# Patient Record
Sex: FEMALE | Race: WHITE | NOT HISPANIC OR LATINO | URBAN - METROPOLITAN AREA
[De-identification: names, ages, dates, MRNs, and addresses within clinical notes are randomized per-mention and may not be internally consistent; named-entity substitution may affect disease eponyms.]

---

## 2023-05-15 LAB
5-HYDROXYINDOLEACETATE (MG/L) IN URINE: NORMAL
5-HYDROXYINDOLEACETATE (MG/ML) IN 24 HOUR URINE: NORMAL
ALANINE AMINOTRANSFERASE (SGPT) (U/L) IN SER/PLAS: NORMAL
ALBUMIN (G/DL) IN SER/PLAS: NORMAL
ALKALINE PHOSPHATASE (U/L) IN SER/PLAS: NORMAL
ANION GAP IN SER/PLAS: NORMAL
ASPARTATE AMINOTRANSFERASE (SGOT) (U/L) IN SER/PLAS: NORMAL
BILIRUBIN TOTAL (MG/DL) IN SER/PLAS: NORMAL
C PEPTIDE (NG/ML) IN SER/PLAS: NORMAL
CALCIUM (MG/DL) IN SER/PLAS: NORMAL
CARBON DIOXIDE, TOTAL (MMOL/L) IN SER/PLAS: NORMAL
CHLORIDE (MMOL/L) IN SER/PLAS: NORMAL
CHOLESTEROL (MG/DL) IN SER/PLAS: NORMAL
CHOLESTEROL IN HDL (MG/DL) IN SER/PLAS: NORMAL
CHOLESTEROL/HDL RATIO: NORMAL
COLLECTION DURATION OF URINE: 24 HR
COLLECTION DURATION OF URINE: NORMAL
CREATININE (MG/24HR) IN 24 HOUR URINE: 1.43 G/24H (ref 0.67–1.59)
CREATININE (MG/24HR) IN 24 HOUR URINE: NORMAL
CREATININE (MG/DL) IN SER/PLAS: NORMAL
CREATININE (MG/DL) IN URINE: 81.9 MG/DL (ref 20–320)
CREATININE (MG/DL) IN URINE: NORMAL
ESTIMATED AVERAGE GLUCOSE FOR HBA1C: NORMAL
GFR FEMALE: NORMAL
GFR MALE: NORMAL
GLUCOSE (MG/DL) IN SER/PLAS: NORMAL
HEMOGLOBIN A1C/HEMOGLOBIN TOTAL IN BLOOD: NORMAL
HGB A1C-DATA CONVERSION: NORMAL %
INSULIN: NORMAL
LDL: NORMAL
LIPASE (U/L) IN SER/PLAS: NORMAL
NON HDL CHOLESTEROL: NORMAL
POTASSIUM (MMOL/L) IN SER/PLAS: NORMAL
PROTEIN TOTAL: NORMAL
SODIUM (MMOL/L) IN SER/PLAS: NORMAL
THYROTROPIN (MIU/L) IN SER/PLAS BY DETECTION LIMIT <= 0.05 MIU/L: NORMAL
TRIGLYCERIDE (MG/DL) IN SER/PLAS: NORMAL
UREA NITROGEN (MG/DL) IN SER/PLAS: NORMAL
VLDL: NORMAL
VOLUME OF URINE (LC): NORMAL
VOLUME OF URINE: 1750 ML
VOLUME OF URINE: NORMAL

## 2023-06-12 LAB
5-HYDROXYINDOLEACETATE (MG/L) IN URINE: 2.8 MG/L
5-HYDROXYINDOLEACETATE (MG/ML) IN 24 HOUR URINE: 4.9 MG/24 HR (ref 0–14.9)
VOLUME OF URINE (LC): 1750

## 2023-10-03 ENCOUNTER — TELEPHONE (OUTPATIENT)
Dept: INTERNAL MEDICINE | Facility: HOSPITAL | Age: 63
End: 2023-10-03
Payer: COMMERCIAL

## 2023-10-03 DIAGNOSIS — T38.0X5A STEROID-INDUCED HYPERGLYCEMIA: ICD-10-CM

## 2023-10-03 DIAGNOSIS — R73.9 STEROID-INDUCED HYPERGLYCEMIA: ICD-10-CM

## 2023-10-03 DIAGNOSIS — R73.03 PREDIABETES: Primary | ICD-10-CM

## 2023-10-03 RX ORDER — GLIPIZIDE 5 MG/1
5 TABLET ORAL
Qty: 30 TABLET | Refills: 11 | Status: SHIPPED | OUTPATIENT
Start: 2023-10-03 | End: 2023-10-06 | Stop reason: SDUPTHER

## 2023-10-03 NOTE — TELEPHONE ENCOUNTER
SSI per belwo plus glipzide with high dose steroid   Humolog  if sugars before meals  <200 no humolog   201-250 use 2 units of humolog   251-300 use 3 units of humolog  >301 use 4 units  of humolog and not coming down after 40 min.

## 2023-10-05 PROBLEM — R11.0 NAUSEA IN ADULT: Status: ACTIVE | Noted: 2023-10-05

## 2023-10-05 PROBLEM — J32.9 RECURRENT SINUSITIS: Status: ACTIVE | Noted: 2023-10-05

## 2023-10-05 PROBLEM — F95.9 SIMPLE TICS: Status: ACTIVE | Noted: 2023-10-05

## 2023-10-05 PROBLEM — M17.11 PRIMARY OSTEOARTHRITIS OF RIGHT KNEE: Status: ACTIVE | Noted: 2023-10-05

## 2023-10-05 PROBLEM — M54.50 LOW BACK PAIN: Status: ACTIVE | Noted: 2023-10-05

## 2023-10-05 PROBLEM — K52.9 CHRONIC DIARRHEA OF UNKNOWN ORIGIN: Status: ACTIVE | Noted: 2023-10-05

## 2023-10-05 PROBLEM — J45.909 ASTHMA (HHS-HCC): Status: ACTIVE | Noted: 2023-10-05

## 2023-10-05 PROBLEM — H57.10 PAIN IN EYE: Status: ACTIVE | Noted: 2023-10-05

## 2023-10-05 PROBLEM — E55.9 VITAMIN D DEFICIENCY: Status: ACTIVE | Noted: 2023-10-05

## 2023-10-05 PROBLEM — R61 EXCESSIVE SWEATING: Status: ACTIVE | Noted: 2023-10-05

## 2023-10-05 PROBLEM — D3A.8 NEUROENDOCRINE TUMOR (CMS-HCC): Status: ACTIVE | Noted: 2023-10-05

## 2023-10-05 PROBLEM — R73.9 BORDERLINE HYPERGLYCEMIA: Status: ACTIVE | Noted: 2023-10-05

## 2023-10-05 PROBLEM — G25.71 RESTLESS MOVEMENT DISORDER: Status: ACTIVE | Noted: 2023-10-05

## 2023-10-05 PROBLEM — E01.2 COLLOID GOITER: Status: ACTIVE | Noted: 2023-10-05

## 2023-10-05 PROBLEM — E78.5 HYPERLIPIDEMIA: Status: ACTIVE | Noted: 2023-10-05

## 2023-10-05 PROBLEM — F51.01 INSOMNIA, IDIOPATHIC: Status: ACTIVE | Noted: 2023-10-05

## 2023-10-05 PROBLEM — I82.90 VENOUS THROMBOSIS: Status: ACTIVE | Noted: 2023-10-05

## 2023-10-05 PROBLEM — E11.9 DIABETES MELLITUS (MULTI): Status: ACTIVE | Noted: 2023-10-05

## 2023-10-05 PROBLEM — R91.8 LUNG NODULE, MULTIPLE: Status: ACTIVE | Noted: 2023-10-05

## 2023-10-05 PROBLEM — I10 ESSENTIAL HYPERTENSION, BENIGN: Status: ACTIVE | Noted: 2023-10-05

## 2023-10-05 PROBLEM — J31.0 CHRONIC RHINITIS: Status: ACTIVE | Noted: 2023-10-05

## 2023-10-05 PROBLEM — E87.6 HYPOKALEMIA: Status: ACTIVE | Noted: 2023-10-05

## 2023-10-05 PROBLEM — R25.1 TREMOR: Status: ACTIVE | Noted: 2023-10-05

## 2023-10-05 PROBLEM — D72.829 LEUKOCYTOSIS: Status: ACTIVE | Noted: 2023-10-05

## 2023-10-05 PROBLEM — F41.1 GENERALIZED ANXIETY DISORDER: Status: ACTIVE | Noted: 2023-10-05

## 2023-10-05 PROBLEM — R00.2 PALPITATIONS: Status: ACTIVE | Noted: 2023-10-05

## 2023-10-05 PROBLEM — R63.4 ABNORMAL WEIGHT LOSS: Status: ACTIVE | Noted: 2023-10-05

## 2023-10-05 RX ORDER — HYDROCHLOROTHIAZIDE 12.5 MG/1
12.5 CAPSULE ORAL DAILY
COMMUNITY
End: 2024-01-10 | Stop reason: WASHOUT

## 2023-10-05 RX ORDER — AZITHROMYCIN 250 MG/1
TABLET, FILM COATED ORAL
COMMUNITY
Start: 2023-05-11 | End: 2024-01-10 | Stop reason: ALTCHOICE

## 2023-10-05 RX ORDER — OMEGA-3-ACID ETHYL ESTERS 1 G/1
1 CAPSULE, LIQUID FILLED ORAL 2 TIMES DAILY
COMMUNITY
Start: 2021-07-14 | End: 2024-01-10 | Stop reason: WASHOUT

## 2023-10-05 RX ORDER — BENZONATATE 100 MG/1
100 CAPSULE ORAL 3 TIMES DAILY PRN
COMMUNITY
Start: 2023-04-26 | End: 2024-01-10 | Stop reason: ALTCHOICE

## 2023-10-05 RX ORDER — ROTIGOTINE 4 MG/24H
1 PATCH, EXTENDED RELEASE TRANSDERMAL DAILY
COMMUNITY
Start: 2023-06-30

## 2023-10-05 RX ORDER — INSULIN LISPRO 100 [IU]/ML
1 INJECTION, SOLUTION INTRAVENOUS; SUBCUTANEOUS
COMMUNITY
Start: 2023-07-09

## 2023-10-05 RX ORDER — PEN NEEDLE, DIABETIC 32GX 5/32"
NEEDLE, DISPOSABLE MISCELLANEOUS 4 TIMES DAILY
COMMUNITY
Start: 2023-04-27

## 2023-10-05 RX ORDER — LOSARTAN POTASSIUM 25 MG/1
25 TABLET ORAL DAILY
COMMUNITY
Start: 2023-09-25

## 2023-10-05 RX ORDER — ORAL SEMAGLUTIDE 7 MG/1
TABLET ORAL
COMMUNITY
Start: 2023-02-10

## 2023-10-05 RX ORDER — FLUTICASONE PROPIONATE 50 MCG
2 SPRAY, SUSPENSION (ML) NASAL DAILY
COMMUNITY
Start: 2023-08-07 | End: 2024-01-10 | Stop reason: ALTCHOICE

## 2023-10-05 RX ORDER — METOPROLOL SUCCINATE 25 MG/1
1 TABLET, EXTENDED RELEASE ORAL DAILY
COMMUNITY
End: 2024-01-10 | Stop reason: ALTCHOICE

## 2023-10-05 RX ORDER — BLOOD-GLUCOSE METER
EACH MISCELLANEOUS 4 TIMES DAILY
COMMUNITY
Start: 2020-06-10

## 2023-10-05 RX ORDER — LISINOPRIL 5 MG/1
5 TABLET ORAL DAILY
COMMUNITY
Start: 2023-02-09 | End: 2024-01-10 | Stop reason: SINTOL

## 2023-10-05 RX ORDER — KETOROLAC TROMETHAMINE 5 MG/ML
1 SOLUTION OPHTHALMIC 4 TIMES DAILY PRN
COMMUNITY
End: 2024-01-10 | Stop reason: ALTCHOICE

## 2023-10-05 RX ORDER — POTASSIUM CHLORIDE 750 MG/1
10 CAPSULE, EXTENDED RELEASE ORAL DAILY
COMMUNITY
End: 2024-01-10 | Stop reason: ALTCHOICE

## 2023-10-05 RX ORDER — PRAVASTATIN SODIUM 10 MG/1
1 TABLET ORAL DAILY
COMMUNITY
Start: 2021-07-12

## 2023-10-05 RX ORDER — CHOLESTYRAMINE 4 G/9G
POWDER, FOR SUSPENSION ORAL
COMMUNITY
Start: 2023-02-21 | End: 2024-01-10 | Stop reason: WASHOUT

## 2023-10-05 RX ORDER — BUDESONIDE AND FORMOTEROL FUMARATE DIHYDRATE 160; 4.5 UG/1; UG/1
AEROSOL RESPIRATORY (INHALATION)
COMMUNITY
Start: 2023-10-03

## 2023-10-05 RX ORDER — BUDESONIDE 180 UG/1
1 AEROSOL, POWDER RESPIRATORY (INHALATION)
COMMUNITY
Start: 2015-05-05 | End: 2024-01-10 | Stop reason: WASHOUT

## 2023-10-05 RX ORDER — LAMOTRIGINE 25 MG/1
TABLET ORAL
COMMUNITY
Start: 2023-01-30

## 2023-10-05 RX ORDER — MELATONIN 10 MG
10 CAPSULE ORAL NIGHTLY
COMMUNITY
End: 2024-01-10 | Stop reason: ALTCHOICE

## 2023-10-05 RX ORDER — ESOMEPRAZOLE MAGNESIUM 40 MG/1
40 CAPSULE, DELAYED RELEASE ORAL DAILY
COMMUNITY

## 2023-10-05 RX ORDER — METHYLPREDNISOLONE 4 MG/1
TABLET ORAL
COMMUNITY
Start: 2023-08-07 | End: 2024-01-10 | Stop reason: ALTCHOICE

## 2023-10-05 RX ORDER — MUPIROCIN 20 MG/G
OINTMENT TOPICAL
COMMUNITY
Start: 2023-05-15 | End: 2024-01-10 | Stop reason: WASHOUT

## 2023-10-05 RX ORDER — ORAL SEMAGLUTIDE 14 MG/1
14 TABLET ORAL DAILY
COMMUNITY
Start: 2023-04-28 | End: 2024-01-10 | Stop reason: WASHOUT

## 2023-10-05 RX ORDER — HYDROCHLOROTHIAZIDE 25 MG/1
25 TABLET ORAL DAILY
COMMUNITY
End: 2024-01-10 | Stop reason: WASHOUT

## 2023-10-05 RX ORDER — POTASSIUM CHLORIDE 20 MEQ/1
1 TABLET, EXTENDED RELEASE ORAL DAILY
COMMUNITY
Start: 2020-06-19 | End: 2024-01-10 | Stop reason: ALTCHOICE

## 2023-10-05 RX ORDER — BUSPIRONE HYDROCHLORIDE 10 MG/1
10 TABLET ORAL 2 TIMES DAILY
COMMUNITY
Start: 2023-09-18

## 2023-10-05 RX ORDER — ROPINIROLE 2 MG/1
1 TABLET, FILM COATED ORAL 3 TIMES DAILY
COMMUNITY
Start: 2021-04-19

## 2023-10-05 RX ORDER — METFORMIN HYDROCHLORIDE 500 MG/1
1 TABLET ORAL DAILY
COMMUNITY
End: 2023-10-06 | Stop reason: ALTCHOICE

## 2023-10-05 RX ORDER — ROTIGOTINE 3 MG/24H
1 PATCH, EXTENDED RELEASE TRANSDERMAL DAILY
COMMUNITY
End: 2024-01-10 | Stop reason: WASHOUT

## 2023-10-05 RX ORDER — AZELASTINE 1 MG/ML
2 SPRAY, METERED NASAL 2 TIMES DAILY
COMMUNITY
Start: 2022-11-23

## 2023-10-05 RX ORDER — CHOLECALCIFEROL (VITAMIN D3) 25 MCG
25 TABLET ORAL DAILY
COMMUNITY
Start: 2021-01-05

## 2023-10-05 RX ORDER — ORAL SEMAGLUTIDE 3 MG/1
3 TABLET ORAL DAILY
COMMUNITY
End: 2024-01-10 | Stop reason: WASHOUT

## 2023-10-05 RX ORDER — REPAGLINIDE 1 MG/1
1 TABLET ORAL
COMMUNITY
Start: 2023-07-01 | End: 2023-10-06 | Stop reason: ALTCHOICE

## 2023-10-05 RX ORDER — LAMOTRIGINE 100 MG/1
1 TABLET ORAL DAILY
COMMUNITY
Start: 2023-09-25

## 2023-10-05 RX ORDER — VALACYCLOVIR HYDROCHLORIDE 1 G/1
1000 TABLET, FILM COATED ORAL DAILY
COMMUNITY

## 2023-10-05 RX ORDER — CLONAZEPAM 1 MG/1
1 TABLET ORAL DAILY
COMMUNITY
Start: 2018-06-26

## 2023-10-05 RX ORDER — ACARBOSE 25 MG/1
25 TABLET ORAL
COMMUNITY
Start: 2023-01-27 | End: 2024-01-10 | Stop reason: WASHOUT

## 2023-10-05 RX ORDER — GUAIFENESIN 600 MG/1
600 TABLET, EXTENDED RELEASE ORAL 2 TIMES DAILY
COMMUNITY
Start: 2020-08-04 | End: 2024-01-10 | Stop reason: WASHOUT

## 2023-10-05 RX ORDER — ROPINIROLE 1 MG/1
TABLET, FILM COATED ORAL
COMMUNITY
Start: 2023-09-30

## 2023-10-05 RX ORDER — DEXAMETHASONE 1 MG/1
TABLET ORAL
COMMUNITY
Start: 2022-11-08 | End: 2024-01-10 | Stop reason: ALTCHOICE

## 2023-10-05 RX ORDER — BUDESONIDE 0.25 MG/2ML
2 INHALANT ORAL
COMMUNITY
Start: 2023-08-07 | End: 2024-01-10 | Stop reason: WASHOUT

## 2023-10-05 RX ORDER — ALBUTEROL SULFATE 90 UG/1
1-2 AEROSOL, METERED RESPIRATORY (INHALATION)
COMMUNITY

## 2023-10-05 RX ORDER — NAPROXEN 500 MG/1
500 TABLET ORAL
COMMUNITY
Start: 2023-09-22

## 2023-10-05 RX ORDER — ROTIGOTINE 8 MG/24H
1 PATCH, EXTENDED RELEASE TRANSDERMAL DAILY
COMMUNITY
Start: 2018-07-03

## 2023-10-05 RX ORDER — LORAZEPAM 0.5 MG/1
TABLET ORAL
COMMUNITY
Start: 2023-08-23

## 2023-10-05 RX ORDER — ESCITALOPRAM OXALATE 20 MG/1
1 TABLET ORAL DAILY
COMMUNITY
Start: 2019-06-05

## 2023-10-05 RX ORDER — HYDROCODONE BITARTRATE AND ACETAMINOPHEN 5; 325 MG/1; MG/1
1 TABLET ORAL EVERY 6 HOURS PRN
COMMUNITY
End: 2024-01-10 | Stop reason: WASHOUT

## 2023-10-05 RX ORDER — CIPROFLOXACIN HYDROCHLORIDE 3 MG/ML
1 SOLUTION/ DROPS OPHTHALMIC EVERY 4 HOURS
COMMUNITY
End: 2024-01-10 | Stop reason: ALTCHOICE

## 2023-10-05 RX ORDER — PANTOPRAZOLE SODIUM 40 MG/1
40 TABLET, DELAYED RELEASE ORAL DAILY
COMMUNITY
Start: 2023-03-07 | End: 2024-01-10 | Stop reason: WASHOUT

## 2023-10-05 RX ORDER — LISINOPRIL 10 MG/1
10 TABLET ORAL DAILY
COMMUNITY
Start: 2023-04-13 | End: 2024-01-10 | Stop reason: SINTOL

## 2023-10-05 RX ORDER — PREDNISONE 5 MG/1
TABLET ORAL
COMMUNITY
Start: 2023-09-21 | End: 2024-01-10 | Stop reason: ALTCHOICE

## 2023-10-05 RX ORDER — METFORMIN HYDROCHLORIDE 750 MG/1
1 TABLET, EXTENDED RELEASE ORAL
COMMUNITY
Start: 2020-08-24 | End: 2024-01-10 | Stop reason: ALTCHOICE

## 2023-10-05 RX ORDER — SALMETEROL XINAFOATE 50 UG/1
1 POWDER, METERED ORAL; RESPIRATORY (INHALATION) EVERY 12 HOURS
COMMUNITY
End: 2024-01-10 | Stop reason: WASHOUT

## 2023-10-05 RX ORDER — ESCITALOPRAM OXALATE 10 MG/1
.5-1 TABLET ORAL DAILY
COMMUNITY
Start: 2023-09-30 | End: 2024-01-10 | Stop reason: WASHOUT

## 2023-10-06 ENCOUNTER — TELEMEDICINE (OUTPATIENT)
Dept: ENDOCRINOLOGY | Facility: CLINIC | Age: 63
End: 2023-10-06
Payer: COMMERCIAL

## 2023-10-06 DIAGNOSIS — R73.9 STEROID-INDUCED HYPERGLYCEMIA: ICD-10-CM

## 2023-10-06 DIAGNOSIS — T38.0X5A STEROID-INDUCED HYPERGLYCEMIA: ICD-10-CM

## 2023-10-06 PROCEDURE — 99213 OFFICE O/P EST LOW 20 MIN: CPT | Performed by: INTERNAL MEDICINE

## 2023-10-06 RX ORDER — GLIPIZIDE 5 MG/1
5 TABLET ORAL DAILY
Qty: 30 TABLET | Refills: 11 | Status: SHIPPED | OUTPATIENT
Start: 2023-10-06 | End: 2024-10-05

## 2023-10-06 NOTE — PROGRESS NOTES
Patient Discussion/Summary     Please use humolog SSI for blood sugars before meals per below:   If sugars before meals <180 no extra humolog  181-200 use 1 unit of humolog  201-250  use 2 units of humolog  251-300 use 3 units of humolog  >301 use 4 units of humolog if not coming down after 40 min call MD   Please continue Rybelsus 3 mg daily, please  use glipizide 5 mg daily in am with prednisone when it is 10 mg or more per day.    Provider Impressions     62 yo CF with ADHD and rest leg syn was referred by her PCP for year complain of fatigue heat intolerance and flushing and jitteriness:which was found to be reactive hypoglycemia, she recently had BG in 200s and was diagnosed with diabetes started on metformin and off acarbose , A1c at the DM range now no more hypoglycemia , seeing GI for dairrhea IBS,CT abdomen no acute findings CT chest lung nodule seeing pulmonary had COVID last summer [id number], diarrhea worse with flushing 5HIAA normal, cehck VMA and chromogranin off PPI was better but still elevated will check again and will order DOTATE scan , check estradiol, A1c and TFT again, USG thyroid 1cm tirad 4 nodule needs further follow up with [facility]G in 6-12 months. check calcitonin as well. seeing PCP for flushing , referral to gi as well stop metformin and try higher dose of rubesus , ct pending no more weight loss. Seeing leonardo was on steroid tapering for possible RA, no acute issues, 5HIAA normal, no more flushing since stopping metformin her flushing episodes are better no acute issues now, Bg were higher on steroid tapering humolog was given  2- diabetes comp screening eye exam updated no DR , monofilament test 6/6 bilat      Chief Complaint     An interactive audio and video telecommunication system which permits real time communications between the patient (at the originating site) and provider (at the distant site) was utilized to provide this telehealth service.    Verbal consent was requested and obtained  from LORETTA PORTILLO  for a telehealth visit.   Follow up visit diabetes      History of Present [id number] yo CF with ADHD ,anxiety, rest leg syn and hypokalemia and is here to see endocrinology she is fatigued, insomnia, joint pain in knees. sleep apnea was ruled out. she is diagnosed with rest leg syn just recently. she is jittery , excessive sweating in her hair and chest, menopause at age 50. ( hysterectomy at age 40 , several miscarriage ,2 children)regular cycles, menarche at age 12- 14. no FH of thyroid issues.no swelling in neck no dysphagia.some HA , some increased BP. some diarrhea. no CP, no SOB, no hypoglycemia, +FH of breast cancer.   she was admittedbefore for 72 hr fast , her BG did not drop no insulin excess mixed meal test showed elevated insulin and hyperglycemia which can indicate post prandial hypoglycemia issues due to her base line prediabetes, she agreed with starting acarbose, we will stop metformin and we will refer her to dietitian , no documented hypoglycemia since discharge.    is here for follow up: still having flushing no hypoglycemia diarrhea still katie g on attributed to IBS we will recheck chromogranin A and VMA as well weight stable, BG 2 adys ago in 55-60 on gerard due to not eating still take metformin 750 mg bid and Rybesus. will see pCP for holter monitor will check FSH , LH estradiol and am cortisol as well. Pt is having weight loss, diarrhea and flushing we will order DOTATE scan. with elevated chromogranin, Thyroid USG small nodule check calcitonin.   back on rybelsus 3 mg daily and  off metfromin 750 mg bdi and flushing better tolerating Rybelsus  better ,A1c 6.4     Review of Systems     Constitutional: recent weight loss and feeling tired (fatigue), but no fever, no chills, normal appetite and no recent weight gain.   Endocrine:.   hot flashes.    night sweats.    intolerance to heat.   Eyes: no eye pain, no double vision and no change in vision.   ENT: no hearing loss, no  dental problems, no sore throat, no change in voice and as noted in HPI.   Cardiovascular: no chest pain, no palpitations, no intermittent leg claudication and no lower extremity edema.   Respiratory: no shortness of breath, no wheezing and no cough.   Gastrointestinal: abdominal pain and diarrhea, but no constipation, no nausea and no vomiting.   Genitourinary: no dysuria, no incontinence and no urinary hesitancy.   Genitourinary: no dysuria, no incontinence and no urinary hesitancy.   Musculoskeletal: no arthralgias, no myalgias, no muscle cramps, no joint swelling, no joint stiffness and no limb pain.   Integumentary: no change in skin color, no skin lesions, no itching, no excessive sweating and no skin wound.   Neurological: no confusion, no convulsions, no dizziness, no fainting, no tremors, no foot pain, no limb weakness and no difficulty walking.   Psychiatric: no anxiety, no depression, no personality change, no emotional problems, no suicidal ideation, no sleep disturbances and no panic attacks.   Hematologic/Lymphatic: no swollen glands in the neck, no tendency for easy bruising and no tendency for easy bleeding.   All other systems have been reviewed and are negative for complaint.      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0) (G25.71)   · Simple tics (307.20) (F95.9)   · Tremor (781.0) (R25.1)   · Vitamin D deficiency (268.9) (E55.9)     Past Medical History  Problems    · History of Acute sinusitis (461.9) (J01.90)   · Added by Problem List Migration; 2013   · Diabetes mellitus (250.00) (E11.9)   · History of coronary artery disease (V12.59) (Z86.79)   · History of hypertension (V12.59) (Z86.79)   · History of ovarian cancer (V10.43) (Z85.43)   · Personal history of asthma (V12.69) (Z87.09)     Surgical History  Problems    · History of  section   · History of Hysterectomy     Family History  Mother    · Family history of Adenocarcinoma Of The Breast (V16.3)     Social History  Problems    · Being A Social Drinker   · Does not use tobacco (V49.89) (Z78.9)   ·    · Never smoker     Allergies  Medication    · No Known Drug Allergies   Recorded By: Sydney Olsen; 2013 10:39:59 AM     Current Meds     Medication Name Instruction   Acarbose 25 MG Oral Tablet TAKE 1 TABLET BY MOUTH THREE TIMES DAILY WITH MEALS   Azelastine HCl - 0.1 % Nasal Solution 2 PUFFS IN EACH NOSTRIL TWICE A DAY NASALLY 30 DAYS   clonazePAM 1 MG Oral Tablet TAKE 1 TABLET DAILY BY MOUTH   Escitalopram Oxalate 20 MG Oral Tablet TAKE 1 TABLET DAILY BY MOUTH   Fluticasone Propionate 50 MCG/ACT Nasal Suspension instill 2 sprays into each nostril once daily   FreeStyle Familia 2 [place] Device Use reader to check your blood glucose 4 x day   FreeStyle Familia 2 Sensor Change sensor every 14 days as directed   hydroCHLOROthiazide 25 MG Oral Tablet TAKE 1 TABLET DAILY BY MOUTH   lamoTRIgine 25 MG Oral Tablet TAKE 1 TABLET DAILY AS DIRECTED BY MOUTH   Lisinopril 5 MG Oral Tablet TAKE 1 TABLET BY MOUTH EVERY DAY   metFORMIN HCl  MG Oral Tablet Extended Release 24 Hour TAKE 1 TABLET TWICE A DAY WITH MEALS BY  MOUTH   Mucus Relief 600 MG Oral Tablet Extended Release 12 Hour TK 1 T PO BID   Neupro 8 MG/24HR Transdermal Patch 24 Hour 1 patch daily   NexIUM 40 MG Oral Capsule Delayed Release TAKE 1 CAPSULE ONCE DAILY BY MOUTH   Omega-3-acid Ethyl Esters 1 GM Oral Capsule TAKE 1 CAPSULE Twice daily by mouth   OneTouch Delica Lancets 33G TEST 3 TIMES A DAY   OneTouch Verio In Vitro Strip TEST 4 TIMES DAILY.   Pravastatin Sodium 10 MG Oral Tablet Take 1 tablet by mouth  daily as directed   Proventil  (90 Base) MCG/ACT Inhalation Aerosol Solution INHALE 1 TO 2 PUFFS BY MOUTH EVERY 4 TO 6 HOURS AS NEEDED   Pulmicort Flexhaler 180 MCG/ACT Inhalation Aerosol Powder Breath Activated INHALE 1 PUFF TWICE DAILY. RINSE MOUTH AFTER USE.   rOPINIRole HCl - 2 MG Oral Tablet TAKE 1 TABLET BY MOUTH THREE TIMES DAILY   Rybelsus 3 MG Oral Tablet TAKE 1 TABLET BY MOUTH EVERY DAY   Serevent Diskus 50 MCG/DOSE AEPB INHALE 1 PUFF EVERY 12 HOURS.   valACYclovir HCl - 1 GM Oral Tablet TAKE 1 TABLET DAILY BY MOUTH   Vitamin D3 25 MCG (1000 UT) Oral Tablet TAKE 1 TABLET BY MOUTH DAILY

## 2023-10-19 NOTE — TELEPHONE ENCOUNTER
"Spoke with patient regarding rybelsus refill. Informed patient refill can not be granted at this time as provider is no longer with practice. Patient declined to schedule with a new endocrine provider stated \"I will hope for the best\" then ended the call.   "

## 2024-01-10 ENCOUNTER — OFFICE VISIT (OUTPATIENT)
Dept: PRIMARY CARE | Facility: EXTERNAL LOCATION | Age: 64
End: 2024-01-10
Payer: COMMERCIAL

## 2024-01-10 VITALS
WEIGHT: 218 LBS | TEMPERATURE: 98.9 F | BODY MASS INDEX: 38.62 KG/M2 | HEART RATE: 84 BPM | OXYGEN SATURATION: 97 % | DIASTOLIC BLOOD PRESSURE: 86 MMHG | RESPIRATION RATE: 17 BRPM | SYSTOLIC BLOOD PRESSURE: 130 MMHG

## 2024-01-10 DIAGNOSIS — J06.9 UPPER RESPIRATORY TRACT INFECTION, UNSPECIFIED TYPE: Primary | ICD-10-CM

## 2024-01-10 PROBLEM — J34.2 NASAL SEPTAL DEVIATION: Status: ACTIVE | Noted: 2023-08-11

## 2024-01-10 PROBLEM — J32.8 OTHER CHRONIC SINUSITIS: Status: ACTIVE | Noted: 2023-08-11

## 2024-01-10 RX ORDER — DOXYCYCLINE 100 MG/1
100 CAPSULE ORAL 2 TIMES DAILY
Qty: 14 CAPSULE | Refills: 0 | Status: SHIPPED | OUTPATIENT
Start: 2024-01-10 | End: 2024-01-17

## 2024-01-10 ASSESSMENT — ENCOUNTER SYMPTOMS
SINUS PRESSURE: 1
SHORTNESS OF BREATH: 0
WHEEZING: 0
COUGH: 1
RHINORRHEA: 1
CHILLS: 0
FEVER: 0

## 2024-01-10 NOTE — PROGRESS NOTES
"Subjective   Patient ID: Asmita Laura is a 63 y.o. female who presents for URI (Scratchy throat, cough, congestion for 8-9 days).    HPI:  8-9 days of cough,congestion, sinus pressure and scratchy throat.   Ears starting hurting last night.  Yellow sputum.   No chest pain or SOB.   No fever or chills.   Covid test negative at home.     Had sinus surgery for chronic sinusitis 1.5 months ago.    Allergies   Allergen Reactions    Sulfamethoxazole-Trimethoprim Rash    Vancomycin Itching    Adhesive Itching     Please use paper tape    Amoxicillin-Pot Clavulanate GI Upset and Unknown    Adhesive Tape-Silicones Rash    Hydrocodone-Homatropine Itching       Current Outpatient Medications on File Prior to Visit   Medication Sig    albuterol (Proventil HFA) 90 mcg/actuation inhaler Inhale 1-2 puffs. Every 4-6 hours prn    azelastine (Astelin) 137 mcg (0.1 %) nasal spray Administer 2 sprays into each nostril 2 times a day.    BD Diana 2nd Gen Pen Needle 32 gauge x 5/32\" needle 4 times a day.    blood sugar diagnostic (OneTouch Verio test strips) strip 4 times a day.    busPIRone (Buspar) 10 mg tablet Take 1 tablet (10 mg) by mouth 2 times a day.    cholecalciferol (Vitamin D-3) 25 MCG (1000 UT) tablet Take 1 tablet (25 mcg) by mouth once daily.    clonazePAM (KlonoPIN) 1 mg tablet Take 1 tablet (1 mg) by mouth once daily.    escitalopram (Lexapro) 20 mg tablet Take 1 tablet (20 mg) by mouth once daily.    esomeprazole (NexIUM) 40 mg DR capsule Take 1 capsule (40 mg) by mouth once daily.    glipiZIDE (Glucotrol) 5 mg tablet Take 1 tablet (5 mg) by mouth once daily. Only use with morning prednisone when it is above 10 mg.    HumaLOG KwikPen Insulin 100 unit/mL injection Inject 1 Units under the skin 3 times a day with meals. Sliding scale with meals for max 25 units per day    lamoTRIgine (LaMICtal) 100 mg tablet Take 1 tablet (100 mg) by mouth once daily.    lamoTRIgine (LaMICtal) 25 mg tablet     LORazepam (Ativan) 0.5 mg " tablet     losartan (Cozaar) 25 mg tablet Take 1 tablet (25 mg) by mouth once daily.    naproxen (Naprosyn) 500 mg tablet Take 1 tablet (500 mg) by mouth 2 times a day with meals.    Neupro 4 mg/24 hour Place 1 patch on the skin once daily.    NON FORMULARY Apply topically. APPLY 1-2 GRAMS (1-2 PUMPS)  EVERY 6-8 HOURS AS NEEDED FOR PAIN. (AMITRIPTYLINE 2%, CAPSAICIN 0.025%, GABAPENTIN 6%, LIDOCAINE 2% IN SALTSTABLE)    pravastatin (Pravachol) 10 mg tablet Take 1 tablet (10 mg) by mouth once daily.    rOPINIRole (Requip) 1 mg tablet     rOPINIRole (Requip) 2 mg tablet Take 1 tablet (2 mg) by mouth 3 times a day.    rotigotine (Neupro) 8 mg/24 hour patch 24 hour patch Place 1 patch on the skin once daily.    Rybelsus 7 mg tablet     Symbicort 160-4.5 mcg/actuation inhaler     valACYclovir (Valtrex) 1 gram tablet Take 1 tablet (1,000 mg) by mouth once daily.    [DISCONTINUED] acarbose (Precose) 25 mg tablet Take 1 tablet (25 mg) by mouth 3 times a day with meals.    [DISCONTINUED] azithromycin (Zithromax) 250 mg tablet     [DISCONTINUED] benzonatate (Tessalon) 100 mg capsule Take 1 capsule (100 mg) by mouth 3 times a day as needed for cough.    [DISCONTINUED] budesonide (Pulmicort Flexhaler) 180 mcg/actuation inhaler Inhale 1 puff 2 times a day. Rinse mouth after use    [DISCONTINUED] budesonide (Pulmicort) 0.25 mg/2 mL nebulizer solution 2 mL (0.25 mg) 2 times a day. As a sinus rinse    [DISCONTINUED] cholestyramine (Questran) 4 gram powder     [DISCONTINUED] ciprofloxacin (Ciloxan) 0.3 % ophthalmic solution Administer 1 drop into the left eye every 4 hours. Use while awake    [DISCONTINUED] dexAMETHasone (Decadron) 1 mg tablet     [DISCONTINUED] escitalopram (Lexapro) 10 mg tablet Take 0.5-1 tablets (5-10 mg) by mouth once daily.    [DISCONTINUED] fluticasone (Flonase) 50 mcg/actuation nasal spray Administer 2 sprays into each nostril once daily. Shake liquid prior to use.    [DISCONTINUED] guaiFENesin (Mucinex) 600  mg 12 hr tablet Take 1 tablet (600 mg) by mouth 2 times a day.    [DISCONTINUED] hydroCHLOROthiazide (HYDRODiuril) 25 mg tablet Take 1 tablet (25 mg) by mouth once daily.    [DISCONTINUED] hydroCHLOROthiazide (Microzide) 12.5 mg capsule Take 1 capsule (12.5 mg) by mouth once daily.    [DISCONTINUED] HYDROcodone-acetaminophen (Norco) 5-325 mg tablet Take 1 tablet by mouth every 6 hours if needed.    [DISCONTINUED] ketorolac (Acular) 0.5 % ophthalmic solution Administer 1 drop into the left eye 4 times a day as needed (pain).    [DISCONTINUED] lisinopril 10 mg tablet Take 1 tablet (10 mg) by mouth once daily.    [DISCONTINUED] lisinopril 5 mg tablet Take 1 tablet (5 mg) by mouth once daily.    [DISCONTINUED] melatonin 10 mg capsule Take 1 capsule (10 mg) by mouth once daily at bedtime.    [DISCONTINUED] metFORMIN XR (Glucophage-XR) 750 mg 24 hr tablet Take 1 tablet (750 mg) by mouth 2 times a day with meals.    [DISCONTINUED] methylPREDNISolone (Medrol Dospak) 4 mg tablets FOLLOW PACKAGE DIRECTIONS    [DISCONTINUED] metoprolol succinate XL (Toprol-XL) 25 mg 24 hr tablet Take 1 tablet (25 mg) by mouth once daily.    [DISCONTINUED] mupirocin (Bactroban) 2 % ointment     [DISCONTINUED] omega-3 acid ethyl esters (Lovaza) 1 gram capsule Take 1 capsule (1 g) by mouth 2 times a day.    [DISCONTINUED] pantoprazole (ProtoNix) 40 mg EC tablet Take 1 tablet (40 mg) by mouth once daily.    [DISCONTINUED] potassium chloride CR 20 mEq ER tablet Take 1 tablet (20 mEq) by mouth once daily.    [DISCONTINUED] potassium chloride ER (Micro-K) 10 mEq ER capsule Take 1 capsule (10 mEq) by mouth once daily.    [DISCONTINUED] predniSONE (Deltasone) 5 mg tablet     [DISCONTINUED] rotigotine (Neupro) 3 mg/24 hour patch 24 hour Place 1 patch on the skin once daily.    [DISCONTINUED] Rybelsus 14 mg tablet tablet Take 1 tablet (14 mg) by mouth once daily.    [DISCONTINUED] Rybelsus 3 mg tablet Take 1 tablet (3 mg) by mouth once daily.     [DISCONTINUED] salmeterol (Serevent Diskus) 50 mcg/dose diskus inhaler Inhale 1 puff every 12 hours.     No current facility-administered medications on file prior to visit.        Past Medical History:   Diagnosis Date    Acute medial meniscus tear of right knee 2015    Asthma     Esophageal reflux 2015    Generalized anxiety disorder 10/05/2023    Hyperlipidemia 10/05/2023    Hypertension     Insomnia, idiopathic 10/05/2023    Low back pain 10/05/2023    Lung nodule, multiple 10/05/2023    Nasal septal deviation 2023    Neuroendocrine tumor 10/05/2023    Other chronic sinusitis 2023    Personal history of malignant neoplasm of ovary     History of ovarian cancer    Personal history of other diseases of the circulatory system     History of coronary artery disease    Restless movement disorder 10/05/2023    Type 2 diabetes mellitus without complications (CMS/[facility]) 10/28/2022    Diabetes mellitus    Vitamin D deficiency 10/05/2023       Past Surgical History:   Procedure Laterality Date    APPENDECTOMY      BACK SURGERY       SECTION, CLASSIC      HERNIA REPAIR      HYSTERECTOMY      KNEE ARTHROSCOPY W/ MENISCAL REPAIR Right     NECK SURGERY      OOPHORECTOMY      SINUS SURGERY      TONSILLECTOMY         Review of Systems   Constitutional:  Negative for chills and fever.   HENT:  Positive for congestion, ear pain, postnasal drip, rhinorrhea and sinus pressure.    Respiratory:  Positive for cough. Negative for shortness of breath and wheezing.    Cardiovascular:  Negative for chest pain.       Objective   Visit Vitals  /86   Pulse 84   Temp 37.2 °C (98.9 °F)   Resp 17   Wt 98.9 kg (218 lb)   SpO2 97%   BMI 38.62 kg/m²   Smoking Status Never   BSA 2.1 m²       Physical Exam  Constitutional:       General: She is not in acute distress.     Appearance: Normal appearance. She is not ill-appearing.   HENT:      Right Ear: Tympanic membrane, ear canal and external ear normal. There is  no impacted cerumen.      Left Ear: Tympanic membrane, ear canal and external ear normal. There is no impacted cerumen.      Ears:      Comments: Clear fluid noted behind LUIS TM's.      Nose: Congestion present.      Mouth/Throat:      Pharynx: Oropharynx is clear. No oropharyngeal exudate or posterior oropharyngeal erythema.   Eyes:      General:         Right eye: No discharge.         Left eye: No discharge.      Extraocular Movements: Extraocular movements intact.      Conjunctiva/sclera: Conjunctivae normal.      Pupils: Pupils are equal, round, and reactive to light.   Cardiovascular:      Rate and Rhythm: Normal rate and regular rhythm.   Pulmonary:      Effort: Pulmonary effort is normal. No respiratory distress.      Breath sounds: Normal breath sounds. No wheezing, rhonchi or rales.   Musculoskeletal:      Cervical back: Normal range of motion and neck supple.   Neurological:      General: No focal deficit present.      Mental Status: She is alert and oriented to person, place, and time. Mental status is at baseline.   Psychiatric:         Mood and Affect: Mood normal.         Behavior: Behavior normal.         Assessment/Plan   Diagnoses and all orders for this visit:  Upper respiratory tract infection, unspecified type  -     doxycycline (Monodox) 100 mg capsule; Take 1 capsule (100 mg) by mouth 2 times a day for 7 days. Take with at least 8 ounces (large glass) of water, do not lie down for 30 minutes after    - Antibiotic sent to the pharmacy. Possible side effects discussed.   - Encouraged increased liquid intake. Can use humidified air in sleeping areas.  - Follow up with PCP or ENT in 3-5 days if no improvement. Sooner with concerns or worsening symptoms.

## 2024-01-10 NOTE — PATIENT INSTRUCTIONS
SINUSITIS TREATMENT  - Your antibiotic was sent to the pharmacy  - Possible medication side effects and interactions were discussed.  - Recommend probiotic.   - Follow up with your primary care provider  in 3-5 days if no improvement.   - Return to clinic sooner with concerns or  any worsening symptoms.     STEPS YOU CAN TAKE AT HOME  - Drink lots of liquids to stay hydrated.  - Use cool mist humidifier in sleeping areas to avoid dry air.   - Wash your hands often. This will help keep others healthy.  - Do not smoke or be in smoke-filled places. Avoid things that may cause breathing problems like fumes, pollution, dust, and other common allergens and irritants.  - Avoid water diving. This forces water into the sinuses from the nasal passages.  - You may take over the counter medication to help with pain as needed.      CALL YOUR PROVIDER OR GO TO URGENT CARE IF :  You have a stiff neck, especially if you also have fever, chills, vomiting, or severe headache  You have trouble thinking clearly.  You have trouble seeing or have double vision.  You have swelling or redness or pain around one or both eyes.  You have a fever of 102°F (38.9°C) or higher, or have shaking chills or sweats.  You have an upset stomach and throwing up.  You have more pain in your face and head.   You develop chest pain or shortness of breath.   With any worsening symptoms/concerns.

## 2024-03-06 ENCOUNTER — OFFICE VISIT (OUTPATIENT)
Dept: PRIMARY CARE | Facility: EXTERNAL LOCATION | Age: 64
End: 2024-03-06

## 2024-03-06 VITALS
WEIGHT: 216 LBS | RESPIRATION RATE: 17 BRPM | TEMPERATURE: 98.8 F | BODY MASS INDEX: 38.26 KG/M2 | OXYGEN SATURATION: 97 % | DIASTOLIC BLOOD PRESSURE: 84 MMHG | SYSTOLIC BLOOD PRESSURE: 130 MMHG | HEART RATE: 84 BPM

## 2024-03-06 DIAGNOSIS — J01.90 ACUTE NON-RECURRENT SINUSITIS, UNSPECIFIED LOCATION: Primary | ICD-10-CM

## 2024-03-06 PROBLEM — J34.3 HYPERTROPHY OF NASAL TURBINATES: Status: ACTIVE | Noted: 2023-08-11

## 2024-03-06 RX ORDER — SEMAGLUTIDE 0.68 MG/ML
INJECTION, SOLUTION SUBCUTANEOUS
COMMUNITY
Start: 2024-01-12

## 2024-03-06 RX ORDER — SEMAGLUTIDE 1.34 MG/ML
INJECTION, SOLUTION SUBCUTANEOUS
COMMUNITY
Start: 2024-02-20

## 2024-03-06 RX ORDER — DOXYCYCLINE 100 MG/1
100 CAPSULE ORAL 2 TIMES DAILY
Qty: 14 CAPSULE | Refills: 0 | Status: SHIPPED | OUTPATIENT
Start: 2024-03-06 | End: 2024-03-13

## 2024-03-06 NOTE — PROGRESS NOTES
"Subjective   Patient ID: Asmita Laura is a 63 y.o. female who presents for Cough.    HPI:  2 weeks ago cold symptoms: runny nose watery eyes. Got better but then worse again.     Now with cough- productive with thick mucus, left ear hurts, green nasal discharge, some sinus pressure and drainage.     No fever or chills.   No chest pain or SOB.   Allergies   Allergen Reactions    Sulfamethoxazole-Trimethoprim Rash    Vancomycin Itching    Adhesive Itching     Please use paper tape    Amoxicillin-Pot Clavulanate GI Upset and Unknown    Adhesive Tape-Silicones Rash    Hydrocodone-Homatropine Itching       Current Outpatient Medications on File Prior to Visit   Medication Sig    albuterol (Proventil HFA) 90 mcg/actuation inhaler Inhale 1-2 puffs. Every 4-6 hours prn    azelastine (Astelin) 137 mcg (0.1 %) nasal spray Administer 2 sprays into each nostril 2 times a day.    BD Diana 2nd Gen Pen Needle 32 gauge x 5/32\" needle 4 times a day.    blood sugar diagnostic (OneTouch Verio test strips) strip 4 times a day.    busPIRone (Buspar) 10 mg tablet Take 1 tablet (10 mg) by mouth 2 times a day.    cholecalciferol (Vitamin D-3) 25 MCG (1000 UT) tablet Take 1 tablet (25 mcg) by mouth once daily.    clonazePAM (KlonoPIN) 1 mg tablet Take 1 tablet (1 mg) by mouth once daily.    escitalopram (Lexapro) 20 mg tablet Take 1 tablet (20 mg) by mouth once daily.    esomeprazole (NexIUM) 40 mg DR capsule Take 1 capsule (40 mg) by mouth once daily.    glipiZIDE (Glucotrol) 5 mg tablet Take 1 tablet (5 mg) by mouth once daily. Only use with morning prednisone when it is above 10 mg.    HumaLOG KwikPen Insulin 100 unit/mL injection Inject 1 Units under the skin 3 times a day with meals. Sliding scale with meals for max 25 units per day    lamoTRIgine (LaMICtal) 100 mg tablet Take 1 tablet (100 mg) by mouth once daily.    lamoTRIgine (LaMICtal) 25 mg tablet     LORazepam (Ativan) 0.5 mg tablet     losartan (Cozaar) 25 mg tablet Take 1 " tablet (25 mg) by mouth once daily.    naproxen (Naprosyn) 500 mg tablet Take 1 tablet (500 mg) by mouth 2 times a day with meals.    Neupro 4 mg/24 hour Place 1 patch on the skin once daily.    Ozempic 1 mg/dose (4 mg/3 mL) pen injector     pravastatin (Pravachol) 10 mg tablet Take 1 tablet (10 mg) by mouth once daily.    rOPINIRole (Requip) 1 mg tablet     Symbicort 160-4.5 mcg/actuation inhaler     valACYclovir (Valtrex) 1 gram tablet Take 1 tablet (1,000 mg) by mouth once daily.    NON FORMULARY Apply topically. APPLY 1-2 GRAMS (1-2 PUMPS)  EVERY 6-8 HOURS AS NEEDED FOR PAIN. (AMITRIPTYLINE 2%, CAPSAICIN 0.025%, GABAPENTIN 6%, LIDOCAINE 2% IN SALTSTABLE)    Ozempic 0.25 mg or 0.5 mg (2 mg/3 mL) pen injector     rOPINIRole (Requip) 2 mg tablet Take 1 tablet (2 mg) by mouth 3 times a day.    rotigotine (Neupro) 8 mg/24 hour patch 24 hour patch Place 1 patch on the skin once daily.    Rybelsus 7 mg tablet      No current facility-administered medications on file prior to visit.        Past Medical History:   Diagnosis Date    Acute medial meniscus tear of right knee 2015    Asthma     Esophageal reflux 2015    Generalized anxiety disorder 10/05/2023    Hyperlipidemia 10/05/2023    Hypertension     Insomnia, idiopathic 10/05/2023    Low back pain 10/05/2023    Lung nodule, multiple 10/05/2023    Nasal septal deviation 2023    Neuroendocrine tumor 10/05/2023    Other chronic sinusitis 2023    Personal history of malignant neoplasm of ovary     History of ovarian cancer    Personal history of other diseases of the circulatory system     History of coronary artery disease    Restless movement disorder 10/05/2023    Type 2 diabetes mellitus without complications (CMS/HCC) 10/28/2022    Diabetes mellitus    Vitamin D deficiency 10/05/2023       Past Surgical History:   Procedure Laterality Date    APPENDECTOMY      BACK SURGERY       SECTION, CLASSIC      HERNIA REPAIR      HYSTERECTOMY       KNEE ARTHROSCOPY W/ MENISCAL REPAIR Right     NECK SURGERY      OOPHORECTOMY      SINUS SURGERY      TONSILLECTOMY         Review of Systems   Constitutional:  Negative for chills and fever.   HENT:  Positive for congestion, ear pain, rhinorrhea and sinus pressure.    Eyes:  Negative for photophobia, pain, discharge (resolved), redness, itching and visual disturbance.   Respiratory:  Positive for cough. Negative for shortness of breath and wheezing.    Cardiovascular:  Negative for chest pain.       Objective   Visit Vitals  /84   Pulse 84   Temp 37.1 °C (98.8 °F)   Resp 17   Wt 98 kg (216 lb)   SpO2 97%   BMI 38.26 kg/m²   OB Status Hysterectomy   Smoking Status Never   BSA 2.09 m²       Physical Exam  Constitutional:       General: She is not in acute distress.     Appearance: Normal appearance. She is not ill-appearing or toxic-appearing.   HENT:      Right Ear: Tympanic membrane, ear canal and external ear normal.      Left Ear: Tympanic membrane, ear canal and external ear normal.      Nose: Congestion present.      Mouth/Throat:      Mouth: Mucous membranes are moist.      Pharynx: Oropharynx is clear. No oropharyngeal exudate or posterior oropharyngeal erythema.   Eyes:      General:         Right eye: No discharge.         Left eye: No discharge.      Extraocular Movements: Extraocular movements intact.      Conjunctiva/sclera: Conjunctivae normal.      Pupils: Pupils are equal, round, and reactive to light.   Cardiovascular:      Rate and Rhythm: Normal rate and regular rhythm.   Pulmonary:      Effort: Pulmonary effort is normal. No respiratory distress.      Breath sounds: Normal breath sounds. No wheezing, rhonchi or rales.   Musculoskeletal:      Cervical back: Neck supple.   Lymphadenopathy:      Cervical: No cervical adenopathy.   Skin:     General: Skin is warm.   Neurological:      General: No focal deficit present.      Mental Status: She is alert.   Psychiatric:         Mood and Affect:  Mood normal.         Behavior: Behavior normal.         Thought Content: Thought content normal.         Assessment/Plan   Diagnoses and all orders for this visit:  Acute non-recurrent sinusitis, unspecified location  -     doxycycline (Monodox) 100 mg capsule; Take 1 capsule (100 mg) by mouth 2 times a day for 7 days. Take with at least 8 ounces (large glass) of water, do not lie down for 30 minutes after    - Antibiotic sent to the pharmacy. Possible side effects discussed.   - Recommend increased liquid intake and humidified air in sleeping areas.   - Follow up with PCP in 3-5 days if no improvement.  Sooner with concerns or worsening symptoms.

## 2024-03-07 ASSESSMENT — ENCOUNTER SYMPTOMS
SINUS PRESSURE: 1
EYE ITCHING: 0
FEVER: 0
EYE PAIN: 0
EYE REDNESS: 0
WHEEZING: 0
PHOTOPHOBIA: 0
CHILLS: 0
EYE DISCHARGE: 0
COUGH: 1
SHORTNESS OF BREATH: 0
RHINORRHEA: 1

## 2024-03-07 NOTE — PATIENT INSTRUCTIONS
SINUSITIS TREATMENT  - Your antibiotic was sent to the pharmacy  - Possible medication side effects and interactions were discussed.  - Recommend probiotic.   - Recommend Flonase and increased liquid intake.   - Follow up with your primary care provider  in 3 days if no improvement.   - Return to clinic sooner with concerns or  any worsening symptoms.     STEPS YOU CAN TAKE AT HOME  - Drink lots of liquids to stay hydrated.  - Use cool mist humidifier in sleeping areas to avoid dry air.   - Use saline nose drops or a saline nose rinse to relieve stuffiness.  - Wash your hands often. This will help keep others healthy.  - Do not smoke or be in smoke-filled places. Avoid things that may cause breathing problems like fumes, pollution, dust, and other common allergens and irritants.  - Avoid water diving. This forces water into the sinuses from the nasal passages.  - You may take over the counter medication to help with pain as needed.      CALL YOUR PROVIDER OR GO TO URGENT CARE IF :  You have a stiff neck, especially if you also have fever, chills, vomiting, or severe headache  You have trouble thinking clearly.  You have trouble seeing or have double vision.  You have swelling or redness or pain around one or both eyes.  You have a fever of 102°F (38.9°C) or higher, or have shaking chills or sweats.  You have an upset stomach and throwing up.  You have more pain in your face and head.   You develop chest pain or shortness of breath.   With any worsening symptoms/concerns.